# Patient Record
Sex: FEMALE | Race: WHITE | NOT HISPANIC OR LATINO | URBAN - METROPOLITAN AREA
[De-identification: names, ages, dates, MRNs, and addresses within clinical notes are randomized per-mention and may not be internally consistent; named-entity substitution may affect disease eponyms.]

---

## 2017-05-09 ENCOUNTER — OUTPATIENT (OUTPATIENT)
Dept: OUTPATIENT SERVICES | Facility: HOSPITAL | Age: 67
LOS: 1 days | End: 2017-05-09
Payer: MEDICARE

## 2017-05-09 ENCOUNTER — TRANSCRIPTION ENCOUNTER (OUTPATIENT)
Age: 67
End: 2017-05-09

## 2017-05-09 DIAGNOSIS — M16.11 UNILATERAL PRIMARY OSTEOARTHRITIS, RIGHT HIP: ICD-10-CM

## 2017-05-09 PROCEDURE — 20610 DRAIN/INJ JOINT/BURSA W/O US: CPT

## 2017-05-09 PROCEDURE — 77002 NEEDLE LOCALIZATION BY XRAY: CPT

## 2017-06-20 ENCOUNTER — OUTPATIENT (OUTPATIENT)
Dept: OUTPATIENT SERVICES | Facility: HOSPITAL | Age: 67
LOS: 1 days | End: 2017-06-20
Payer: MEDICARE

## 2017-06-20 ENCOUNTER — TRANSCRIPTION ENCOUNTER (OUTPATIENT)
Age: 67
End: 2017-06-20

## 2017-06-20 DIAGNOSIS — M16.11 UNILATERAL PRIMARY OSTEOARTHRITIS, RIGHT HIP: ICD-10-CM

## 2017-06-20 PROCEDURE — 77002 NEEDLE LOCALIZATION BY XRAY: CPT

## 2017-06-20 PROCEDURE — 20610 DRAIN/INJ JOINT/BURSA W/O US: CPT | Mod: RT

## 2017-08-08 ENCOUNTER — OUTPATIENT (OUTPATIENT)
Dept: OUTPATIENT SERVICES | Facility: HOSPITAL | Age: 67
LOS: 1 days | End: 2017-08-08
Payer: MEDICARE

## 2017-08-08 ENCOUNTER — TRANSCRIPTION ENCOUNTER (OUTPATIENT)
Age: 67
End: 2017-08-08

## 2017-08-08 DIAGNOSIS — M47.816 SPONDYLOSIS WITHOUT MYELOPATHY OR RADICULOPATHY, LUMBAR REGION: ICD-10-CM

## 2017-08-15 ENCOUNTER — OUTPATIENT (OUTPATIENT)
Dept: OUTPATIENT SERVICES | Facility: HOSPITAL | Age: 67
LOS: 1 days | End: 2017-08-15
Payer: MEDICARE

## 2017-08-15 ENCOUNTER — TRANSCRIPTION ENCOUNTER (OUTPATIENT)
Age: 67
End: 2017-08-15

## 2017-08-15 DIAGNOSIS — M47.816 SPONDYLOSIS WITHOUT MYELOPATHY OR RADICULOPATHY, LUMBAR REGION: ICD-10-CM

## 2017-08-15 PROCEDURE — 76000 FLUOROSCOPY <1 HR PHYS/QHP: CPT

## 2017-08-15 PROCEDURE — 64494 INJ PARAVERT F JNT L/S 2 LEV: CPT

## 2017-08-15 PROCEDURE — 64493 INJ PARAVERT F JNT L/S 1 LEV: CPT | Mod: 50

## 2017-08-15 PROCEDURE — 64493 INJ PARAVERT F JNT L/S 1 LEV: CPT

## 2017-08-15 PROCEDURE — 64494 INJ PARAVERT F JNT L/S 2 LEV: CPT | Mod: 50

## 2017-10-13 PROBLEM — Z00.00 ENCOUNTER FOR PREVENTIVE HEALTH EXAMINATION: Status: ACTIVE | Noted: 2017-10-13

## 2017-10-18 ENCOUNTER — APPOINTMENT (OUTPATIENT)
Dept: NEUROLOGY | Facility: CLINIC | Age: 67
End: 2017-10-18
Payer: MEDICARE

## 2017-10-18 VITALS
WEIGHT: 130 LBS | DIASTOLIC BLOOD PRESSURE: 70 MMHG | SYSTOLIC BLOOD PRESSURE: 118 MMHG | HEIGHT: 60 IN | BODY MASS INDEX: 25.52 KG/M2

## 2017-10-18 DIAGNOSIS — Z85.118 PERSONAL HISTORY OF OTHER MALIGNANT NEOPLASM OF BRONCHUS AND LUNG: ICD-10-CM

## 2017-10-18 DIAGNOSIS — Z87.891 PERSONAL HISTORY OF NICOTINE DEPENDENCE: ICD-10-CM

## 2017-10-18 DIAGNOSIS — Z87.39 PERSONAL HISTORY OF OTHER DISEASES OF THE MUSCULOSKELETAL SYSTEM AND CONNECTIVE TISSUE: ICD-10-CM

## 2017-10-18 DIAGNOSIS — Z82.49 FAMILY HISTORY OF ISCHEMIC HEART DISEASE AND OTHER DISEASES OF THE CIRCULATORY SYSTEM: ICD-10-CM

## 2017-10-18 PROCEDURE — 99204 OFFICE O/P NEW MOD 45 MIN: CPT

## 2017-10-18 RX ORDER — OXYCODONE AND ACETAMINOPHEN 5; 325 MG/1; MG/1
5-325 TABLET ORAL
Refills: 0 | Status: ACTIVE | COMMUNITY

## 2017-10-18 RX ORDER — TRAMADOL HYDROCHLORIDE 25 MG/1
TABLET, COATED ORAL
Refills: 0 | Status: ACTIVE | COMMUNITY

## 2017-11-10 ENCOUNTER — APPOINTMENT (OUTPATIENT)
Dept: NEUROLOGY | Facility: CLINIC | Age: 67
End: 2017-11-10
Payer: MEDICARE

## 2017-11-10 PROCEDURE — 95886 MUSC TEST DONE W/N TEST COMP: CPT

## 2017-11-10 PROCEDURE — 95909 NRV CNDJ TST 5-6 STUDIES: CPT

## 2017-11-13 ENCOUNTER — APPOINTMENT (OUTPATIENT)
Dept: NEUROLOGY | Facility: CLINIC | Age: 67
End: 2017-11-13
Payer: MEDICARE

## 2017-11-13 VITALS
DIASTOLIC BLOOD PRESSURE: 60 MMHG | BODY MASS INDEX: 22.97 KG/M2 | SYSTOLIC BLOOD PRESSURE: 100 MMHG | HEIGHT: 60 IN | WEIGHT: 117 LBS

## 2017-11-13 DIAGNOSIS — G56.01 CARPAL TUNNEL SYNDROME, RIGHT UPPER LIMB: ICD-10-CM

## 2017-11-13 PROCEDURE — 99213 OFFICE O/P EST LOW 20 MIN: CPT

## 2017-11-13 RX ORDER — DIAZEPAM 5 MG/1
5 TABLET ORAL
Qty: 2 | Refills: 0 | Status: ACTIVE | COMMUNITY
Start: 2017-08-30

## 2017-11-13 RX ORDER — METHYLPREDNISOLONE 4 MG/1
4 TABLET ORAL
Qty: 21 | Refills: 0 | Status: COMPLETED | COMMUNITY
Start: 2017-08-30

## 2021-09-26 DIAGNOSIS — Z01.818 ENCOUNTER FOR OTHER PREPROCEDURAL EXAMINATION: ICD-10-CM

## 2021-09-27 ENCOUNTER — APPOINTMENT (OUTPATIENT)
Dept: DISASTER EMERGENCY | Facility: CLINIC | Age: 71
End: 2021-09-27

## 2021-09-28 LAB — SARS-COV-2 N GENE NPH QL NAA+PROBE: NOT DETECTED

## 2024-07-22 ENCOUNTER — APPOINTMENT (OUTPATIENT)
Dept: ORTHOPEDIC SURGERY | Facility: CLINIC | Age: 74
End: 2024-07-22
Payer: MEDICARE

## 2024-07-22 VITALS — HEIGHT: 63 IN | WEIGHT: 115 LBS | BODY MASS INDEX: 20.38 KG/M2

## 2024-07-22 PROCEDURE — 73010 X-RAY EXAM OF SHOULDER BLADE: CPT | Mod: LT

## 2024-07-22 PROCEDURE — 99204 OFFICE O/P NEW MOD 45 MIN: CPT

## 2024-07-22 PROCEDURE — 73030 X-RAY EXAM OF SHOULDER: CPT | Mod: LT

## 2024-07-22 NOTE — REASON FOR VISIT
[FreeTextEntry2] : This is a 74 year old RHD female retired  with left shoulder/rib pain since February 2024 after radiation treatment. She went to her pain management doctor who gave her an injection and prednisone 10mg with minimal relief. States she previously had radiation treatment in that area at [facility] which fractured her rib causing similar pain. Reaching and lifting are painful. Night symptoms can occur. There is no n/t. Hydrocodone use as needed with temporary relief.

## 2024-07-22 NOTE — PHYSICAL EXAM
[Left] : left shoulder [Moderate] : moderate [] : no sensory deficits [Right] : right shoulder [Sitting] : sitting [FreeTextEntry3] : There is slight proximal atrophy.  [FreeTextEntry9] : IR to T12. [TWNoteComboBox4] : passive forward flexion 170 degrees [de-identified] : external rotation 75 degrees

## 2024-07-22 NOTE — ASSESSMENT
[FreeTextEntry1] : We discussed the underlying pathology. Treatment options reviewed. Medication use discussed. An MRI of the left shoulder is planned.  Medication use discussed.  Aleve 440mg and Tylenol use discussed.  While there is a frozen shoulder, there are chest and rib findings that may be out of my scope of practice.  Cautions discussed. Questions answered.  Patient seen by Neymar Faria M.D. Entered by Ronit Gan acting as scribe.

## 2024-07-22 NOTE — CONSULT LETTER
[Dear  ___] : Dear  [unfilled], [FreeTextEntry1] : Thank you for referring your patient for consultation.  Please see my note below.   If you have any questions, please do not hesitate to contact me.   Sincerely,   Neymar Faria M.D. Shoulder Surgery   [DrKim  ___] : Dr. MEYER

## 2024-07-22 NOTE — IMAGING
[Left] : left shoulder [FreeTextEntry1] : The GH joint is ok. There are minor AC changes. Osteopenia is noted. There is a rib fracture at rib 2 & 3. [FreeTextEntry5] : There is a type I - II acromion.

## 2024-07-22 NOTE — HISTORY OF PRESENT ILLNESS
[10] : 10 [Radiating] : radiating [Sharp] : sharp [Constant] : constant [Household chores] : household chores [Sleep] : sleep [Rest] : rest [Nothing helps with pain getting better] : Nothing helps with pain getting better [Bending forward] : bending forward [Extending back] : extending back [Lying in bed] : lying in bed [Retired] : Work status: retired [de-identified] : 07/22/2024: Patient reports that she has been having pain on her left side for about 5 months. The pain is radiating up her left shoulder around the back. Patient went to her oncologist, and they did a CT scan and told her that she has inflammation. They also had her seen a pain management doctor there that gave her an injection and prednisone 10 mg tablets. Patient states that she did not have any type of relief from either the injection or the pills. [] : This patient has had an injection before: no [FreeTextEntry1] : Left Side [FreeTextEntry7] : Up the Shoulder around her back [de-identified] : Movement [de-identified] : Dr. Lora [de-identified] : CT Scan [de-identified] : CT scan, Prednisone

## 2024-07-23 ENCOUNTER — APPOINTMENT (OUTPATIENT)
Dept: MRI IMAGING | Facility: CLINIC | Age: 74
End: 2024-07-23
Payer: MEDICARE

## 2024-07-23 PROCEDURE — 73221 MRI JOINT UPR EXTREM W/O DYE: CPT | Mod: LT,MH

## 2024-08-02 ENCOUNTER — APPOINTMENT (OUTPATIENT)
Dept: ORTHOPEDIC SURGERY | Facility: CLINIC | Age: 74
End: 2024-08-02
Payer: MEDICARE

## 2024-08-02 DIAGNOSIS — M75.02 ADHESIVE CAPSULITIS OF LEFT SHOULDER: ICD-10-CM

## 2024-08-02 PROCEDURE — 20611 DRAIN/INJ JOINT/BURSA W/US: CPT | Mod: LT

## 2024-08-02 PROCEDURE — 99214 OFFICE O/P EST MOD 30 MIN: CPT | Mod: 25

## 2024-08-02 NOTE — ASSESSMENT
[FreeTextEntry1] : PT is renewed.  Questions addressed.  Patient was seen by Dr. Neymar Faria. Patient was seen by Margarita CID under the supervision of Dr. Neymar Faria. Progress note was completed by Margarita CID.  Procedure Name: Large Joint Injection / Aspiration: Depomedrol, Lidocaine and Guidance Ultrasound   Large Joint Injection was performed because of pain and inflammation. Depomedrol: An injection of Depomedrol 40 mg/cc, 2 cc. Lidocaine: An injection of Lidocaine 1 mg/cc, 13 cc.   Medication was injected in the left subacromial space and glenohumeral joint from a posterior approach. Patient has tried OTC's including aspirin, Ibuprofen, Aleve etc. or prescription NSAIDS, and/or exercises at home and/ or physical therapy without satisfactory response. The risks, benefits, and alternatives to steroid injection were explained in full to the patient. Risks outlined include but are not limited to infection, sepsis, bleeding, scarring, skin discoloration, temporary increase in pain, syncopal episode, failure to resolve symptoms, allergic reaction, symptom recurrence, and elevation of blood sugar in diabetics. Patient understood the risks. All questions were answered. After discussion, patient requested an injection. Oral informed consent was obtained.  Sterile preparation with betadine and aseptic technique was utilized for the procedure, including the preparation of the solutions used for the injection. Patient tolerated the procedure well. Post Procedure Instructions: Patient was advised to call if redness, pain, or fever occur and apply ice for 15 min. out of every hour for the next 12-24 hours as tolerated. Patient was advised to rest the joint(s) for 3 days.  Advised to ice the injection site this evening. Ultrasound Guidance was used for the following reasons: for precise injection in area of tear. Visualization of the needle and placement of injection was performed without complication.

## 2024-08-02 NOTE — PHYSICAL EXAM
[Left] : left shoulder [Moderate] : moderate [Right] : right shoulder [Sitting] : sitting [] : no sensory deficits [FreeTextEntry3] : There is slight proximal atrophy.  [FreeTextEntry9] : IR to T12. [TWNoteComboBox4] : passive forward flexion 170 degrees [de-identified] : external rotation 75 degrees

## 2024-08-02 NOTE — REASON FOR VISIT
[FreeTextEntry2] : This is a 74 year old RHD female retired  with left shoulder/rib pain since February 2024 after radiation treatment. She went to her pain management doctor who gave her an injection and prednisone 10mg with minimal relief. States she previously had radiation treatment in that area at [facility] which fractured her rib causing similar pain. Reaching and lifting are painful. Night symptoms can occur. There is no n/t. Hydrocodone use as needed with temporary relief.  The L  MRI was done.

## 2024-08-02 NOTE — DATA REVIEWED
[FreeTextEntry1] : [facility] MRI L SHOULDER 7/23/24:  The left upper lung and thorax findings appear to be post-radiation effects. This is from her oncologist who had their radiologist review the MRI.  There are degrative changes at the GH joint. AC inflammation is noted. There is inflammation at the biceps. There is some capsule inflammation. To me there is a partial rotator cuff tear.

## 2024-09-09 ENCOUNTER — APPOINTMENT (OUTPATIENT)
Dept: ORTHOPEDIC SURGERY | Facility: CLINIC | Age: 74
End: 2024-09-09
Payer: MEDICARE

## 2024-09-09 VITALS — WEIGHT: 115 LBS | HEIGHT: 63 IN | BODY MASS INDEX: 20.38 KG/M2

## 2024-09-09 DIAGNOSIS — M75.02 ADHESIVE CAPSULITIS OF LEFT SHOULDER: ICD-10-CM

## 2024-09-09 PROCEDURE — 99214 OFFICE O/P EST MOD 30 MIN: CPT

## 2024-09-09 NOTE — PHYSICAL EXAM
[Left] : left shoulder [Moderate] : moderate [Right] : right shoulder [Sitting] : sitting [] : no sensory deficits [FreeTextEntry3] : There is slight proximal atrophy.  [de-identified] : This was not assessed. [FreeTextEntry9] : IR to T12. [TWNoteComboBox4] : passive forward flexion 170 degrees [de-identified] : external rotation 75 degrees

## 2024-09-09 NOTE — HISTORY OF PRESENT ILLNESS
[Retired] : Work status: retired [de-identified] : Follow up L SHOULDER , pain is getting worse again after CSI [] : Post Surgical Visit: no

## 2024-09-09 NOTE — ASSESSMENT
[FreeTextEntry1] : We discussed her course.  PT is renewed.  If symptoms persist, a repeat injection is an option.  Medication use discussed.  Questions addressed.  Patient was seen by Dr. Neymar Faria. Entered by Ronit Gan acting as scribe.

## 2024-09-09 NOTE — REASON FOR VISIT
[FreeTextEntry2] : This is a 74 year old RHD female retired  with left shoulder/rib pain since February 2024 after radiation treatment. She went to her pain management doctor who gave her an injection and prednisone 10mg with minimal relief. States she previously had radiation treatment in that area at [facility] which fractured her rib causing similar pain. Reaching and lifting are painful. Night symptoms can occur. There is no n/t. Hydrocodone use as needed with temporary relief.  The L SH MRI was done 7/23/24. PT will begin 9/19/24. She feels the same since last visit. The SA/GH injection 8/2/24 helped temporarily.

## 2024-09-11 ENCOUNTER — APPOINTMENT (OUTPATIENT)
Dept: PAIN MANAGEMENT | Facility: CLINIC | Age: 74
End: 2024-09-11
Payer: MEDICARE

## 2024-09-11 VITALS — WEIGHT: 115 LBS | BODY MASS INDEX: 20.38 KG/M2 | HEIGHT: 63 IN

## 2024-09-11 DIAGNOSIS — M54.17 RADICULOPATHY, LUMBOSACRAL REGION: ICD-10-CM

## 2024-09-11 DIAGNOSIS — M79.18 MYALGIA, OTHER SITE: ICD-10-CM

## 2024-09-11 PROCEDURE — J3490M: CUSTOM | Mod: NC

## 2024-09-11 PROCEDURE — 20552 NJX 1/MLT TRIGGER POINT 1/2: CPT

## 2024-09-11 PROCEDURE — 99204 OFFICE O/P NEW MOD 45 MIN: CPT | Mod: 25

## 2024-09-11 NOTE — PROCEDURE
[FreeTextEntry3] : Trigger Point was performed because of pain inflammation Anesthesia: ethyl chloride sprayed topically.: Lidocaine .1% 2 cc Marcaine:.25% 2cc  kenalog 10mg/cc 2cc :Needle size: 25 gauge 1 1/2inch.  Medication was injected in the right and left Lumbar paraspinal muscles . Patient has tried OTC's including aspirin, Ibuprofen, Aleve etc or prescription NSAIDS, and/or exercises at home and/ or physical therapy without satisfactory response After verbal consent using sterile preparation and technique.The risks, benefits, and alternatives to cortisone injection were explained in full to the patient. Risks outlined include but are not limited to infection, sepsis, bleeding, scarring, skin discoloration, temporary increase in pain, syncopal episode, failure to resolve symptoms, allergic reaction, symptom recurrence, and elevation of blood sugar in diabetics. Patient understood the risks. All questions were answered. After discussion of options, patient requested an injection. Oral informed consent was obtained and sterile prep was done of the injection site. Sterile technique was utilized for the procedure including the preparation of the solutions used for the injection. Patient tolerated the procedure well. Advised to ice the injection site this evening.  Sterile technique used Prep with alcohol locally to site.

## 2024-09-11 NOTE — DISCUSSION/SUMMARY
[de-identified] : chronic history of low back pain with bl radicular pain.  she has had haylie in the past with 70% relief of pain and adls  will repeat mri ls spine prior to injection therapy  she has failed pt and nsaids  proceed with tpi today

## 2024-09-11 NOTE — HISTORY OF PRESENT ILLNESS
[Lower back] : lower back [10] : 10 [Radiating] : radiating [Sharp] : sharp [Shooting] : shooting [Stabbing] : stabbing [Constant] : constant [Nothing helps with pain getting better] : Nothing helps with pain getting better [Sitting] : sitting [Standing] : standing [Walking] : walking [Bending forward] : bending forward [FreeTextEntry1] : pt states she is having pain in the lower back, the pain goes into the legs she states she has had this for a while ., she has gotten epidurals but she states that her back is really bad  [] : no

## 2024-09-11 NOTE — PHYSICAL EXAM
[de-identified] : PHYSICAL EXAM  Constitutional:  Appears well, no apparent distress Ability to communicate: Normal Respiratory: non-labored breathing Skin: no rash noted Head: normocephalic, atraumatic Neck: no visible thyroid enlargement Eyes: extraocular movements intact Neurologic: alert and oriented x3 Psychiatric: normal mood, affect, and behavior  Lumbar Spine:  Palpation: left and right lumbar paraspinal spasm and left and right lumbar paraspinal tenderness to palpation. ROM: Diminished range of motion in all plains.  Patient notes pain with lateral bending to the left and right. MMT: Motor exam is 5/5 through out bilateral lower extremities. Sensation: Light touch and pain is intact throughout bilateral lower extremities. Reflexes: achilles and patella reflexes are intact and  symmetrical.  No sustained clonus. Special Testing: Positive straight leg raise on the left and right side.  Assessemnt: Radiculopathy of lumbosacral region (M54.17) Myalgia (M79.10)  Plan: After discussing various treatment options with the patient including but not limited to oral medications, physical therapy, exercise modalities as well as interventional spinal injections, we have decided with the following plan: The patient is presenting with acute/sub-acute radicular pain with impairment in ADLs and functionality.  The pain has not responded to conservative care including NSAID therapy and/or physical therapy.  There is no bleeding tendency, unstable medical condition, or systemic infection  The risks, benefits and alternatives of the proposed procedure were explained in detail with the patient.  The risks outlined include but are not limited to infection, bleeding, post dural puncture headache, nerve injury, a temporary increase in pain, failure to resolve symptoms, allergic reaction, symptom recurrence, and possible elevation of blood sugar.  All questions were answered to patient's satisfaction and he/she verbalized an understanding.  Follow up 1-2 weeks post injection foe re-evaluation.  Continue home exercises, stretching, activity modification, physical therapy, and conservative care.

## 2024-09-13 ENCOUNTER — APPOINTMENT (OUTPATIENT)
Dept: MRI IMAGING | Facility: CLINIC | Age: 74
End: 2024-09-13

## 2024-09-13 ENCOUNTER — APPOINTMENT (OUTPATIENT)
Dept: ORTHOPEDIC SURGERY | Facility: CLINIC | Age: 74
End: 2024-09-13

## 2024-09-13 PROCEDURE — 72148 MRI LUMBAR SPINE W/O DYE: CPT | Mod: MH

## 2024-09-26 ENCOUNTER — APPOINTMENT (OUTPATIENT)
Dept: PAIN MANAGEMENT | Facility: CLINIC | Age: 74
End: 2024-09-26
Payer: MEDICARE

## 2024-09-26 VITALS — BODY MASS INDEX: 20.38 KG/M2 | HEIGHT: 63 IN | WEIGHT: 115 LBS

## 2024-09-26 DIAGNOSIS — M54.17 RADICULOPATHY, LUMBOSACRAL REGION: ICD-10-CM

## 2024-09-26 PROCEDURE — 99214 OFFICE O/P EST MOD 30 MIN: CPT

## 2024-09-26 NOTE — HISTORY OF PRESENT ILLNESS
[Lower back] : lower back [2] : 2 [1] : 2 [Radiating] : radiating [Sharp] : sharp [Shooting] : shooting [Stabbing] : stabbing [Constant] : constant [Nothing helps with pain getting better] : Nothing helps with pain getting better [Sitting] : sitting [Standing] : standing [Walking] : walking [Bending forward] : bending forward [FreeTextEntry1] : pt is following up for mri results, MD will review images and report  [] : no

## 2024-09-26 NOTE — PHYSICAL EXAM
[de-identified] : PHYSICAL EXAM  Constitutional:  Appears well, no apparent distress Ability to communicate: Normal Respiratory: non-labored breathing Skin: no rash noted Head: normocephalic, atraumatic Neck: no visible thyroid enlargement Eyes: extraocular movements intact Neurologic: alert and oriented x3 Psychiatric: normal mood, affect, and behavior   Back, including spine: Range of motion of the thoracic and lumbar spine is as follows: Diminished range of motion in all planes.  MMT 5/5 BL LE.  Sensation is intact to light touch and pin prick BL LE.  Negative Straight leg raise testing bilaterally.  Negatvie Kemps maneuver bilaterally.  Normal Gait.   Assessment: Lumbago  Plan: After discussing various treatment options with the patient including but not limited to oral medications, physical therapy, exercise modalities as well as interventional spinal injections, we have decided with the following plan:   Continue home exercises, stretching, activity modification, physical therapy, and conservative care.

## 2024-09-26 NOTE — ASSESSMENT
[FreeTextEntry1] : I personally reviewed the MRI/CT scan images and agree with the radiologist's report. The radiological findings were discussed with the patient

## 2024-10-07 ENCOUNTER — NON-APPOINTMENT (OUTPATIENT)
Age: 74
End: 2024-10-07

## 2024-10-07 ENCOUNTER — APPOINTMENT (OUTPATIENT)
Dept: ORTHOPEDIC SURGERY | Facility: CLINIC | Age: 74
End: 2024-10-07
Payer: MEDICARE

## 2024-10-07 VITALS — HEIGHT: 63 IN | BODY MASS INDEX: 20.38 KG/M2 | WEIGHT: 115 LBS

## 2024-10-07 VITALS — SYSTOLIC BLOOD PRESSURE: 138 MMHG | DIASTOLIC BLOOD PRESSURE: 83 MMHG | HEART RATE: 83 BPM

## 2024-10-07 DIAGNOSIS — G89.29 PAIN IN LEFT SHOULDER: ICD-10-CM

## 2024-10-07 DIAGNOSIS — M19.012 PRIMARY OSTEOARTHRITIS, LEFT SHOULDER: ICD-10-CM

## 2024-10-07 DIAGNOSIS — M25.512 PAIN IN LEFT SHOULDER: ICD-10-CM

## 2024-10-07 PROCEDURE — 99203 OFFICE O/P NEW LOW 30 MIN: CPT

## 2024-11-01 ENCOUNTER — APPOINTMENT (OUTPATIENT)
Dept: ORTHOPEDIC SURGERY | Facility: CLINIC | Age: 74
End: 2024-11-01
Payer: MEDICARE

## 2024-11-01 VITALS — WEIGHT: 115 LBS | BODY MASS INDEX: 20.38 KG/M2 | HEIGHT: 63 IN

## 2024-11-01 DIAGNOSIS — M75.02 ADHESIVE CAPSULITIS OF LEFT SHOULDER: ICD-10-CM

## 2024-11-01 PROCEDURE — 20610 DRAIN/INJ JOINT/BURSA W/O US: CPT | Mod: LT

## 2024-11-01 PROCEDURE — 99214 OFFICE O/P EST MOD 30 MIN: CPT | Mod: 25

## 2024-11-04 ENCOUNTER — APPOINTMENT (OUTPATIENT)
Dept: ORTHOPEDIC SURGERY | Facility: CLINIC | Age: 74
End: 2024-11-04